# Patient Record
Sex: FEMALE | Race: WHITE
[De-identification: names, ages, dates, MRNs, and addresses within clinical notes are randomized per-mention and may not be internally consistent; named-entity substitution may affect disease eponyms.]

---

## 2018-03-05 ENCOUNTER — HOSPITAL ENCOUNTER (OUTPATIENT)
Dept: HOSPITAL 45 - C.CTS | Age: 58
Discharge: HOME | End: 2018-03-05
Attending: PSYCHIATRY & NEUROLOGY
Payer: COMMERCIAL

## 2018-03-05 DIAGNOSIS — E11.42: Primary | ICD-10-CM

## 2018-03-05 DIAGNOSIS — R26.9: ICD-10-CM

## 2018-03-05 DIAGNOSIS — Z89.411: ICD-10-CM

## 2018-03-05 NOTE — DIAGNOSTIC IMAGING REPORT
HEAD WITHOUT CONTRAST (CT)



CLINICAL HISTORY: 57 years-old Female presenting with GAIT ABNORMALITY,

dizziness. 



TECHNIQUE: Multidetector CT imaging of the head was performed without the use of

intravenous contrast. IV contrast: None. A dose lowering technique was used

consistent with the principles of ALARA (as low as reasonably achievable). 



COMPARISON: None.



CT DOSE (mGy.cm): The estimated cumulative dose is 614.27 mGy.cm.



FINDINGS: 



 topogram: Unremarkable.



Ventricles and sulci normal in size. Brain parenchyma normal in appearance with

preserved gray-white differentiation. No mass effect or midline shift. No

hemorrhage or acute territorial infarct. No extra-axial fluid collection.

Paranasal sinuses and mastoid air cells clear. Calvarium intact.    



IMPRESSION:

1.  No acute intracranial abnormality. 







Electronically signed by:  Shiv Nixon M.D.

3/5/2018 12:33 PM



Dictated Date/Time:  3/5/2018 12:31 PM